# Patient Record
Sex: FEMALE | Race: WHITE
[De-identification: names, ages, dates, MRNs, and addresses within clinical notes are randomized per-mention and may not be internally consistent; named-entity substitution may affect disease eponyms.]

---

## 2017-02-24 ENCOUNTER — HOSPITAL ENCOUNTER (OUTPATIENT)
Dept: HOSPITAL 84 - D.RT | Age: 82
Discharge: HOME | End: 2017-02-24
Attending: INTERNAL MEDICINE
Payer: MEDICARE

## 2017-02-24 VITALS — BODY MASS INDEX: 28.2 KG/M2

## 2017-02-24 DIAGNOSIS — C34.90: Primary | ICD-10-CM

## 2017-05-09 ENCOUNTER — HOSPITAL ENCOUNTER (OUTPATIENT)
Dept: HOSPITAL 84 - D.OPS | Age: 82
Discharge: HOME | End: 2017-05-09
Attending: SURGERY
Payer: MEDICARE

## 2017-05-09 VITALS — HEIGHT: 60 IN | BODY MASS INDEX: 25.58 KG/M2 | WEIGHT: 130.27 LBS

## 2017-05-09 VITALS — DIASTOLIC BLOOD PRESSURE: 67 MMHG | SYSTOLIC BLOOD PRESSURE: 145 MMHG

## 2017-05-09 DIAGNOSIS — K44.9: Primary | ICD-10-CM

## 2017-05-09 DIAGNOSIS — J45.909: ICD-10-CM

## 2017-05-09 DIAGNOSIS — Z01.812: ICD-10-CM

## 2017-05-09 DIAGNOSIS — K21.9: ICD-10-CM

## 2017-05-09 DIAGNOSIS — Z95.1: ICD-10-CM

## 2017-05-09 DIAGNOSIS — I25.10: ICD-10-CM

## 2017-05-09 LAB
ANION GAP SERPL CALC-SCNC: 10.9 MMOL/L (ref 8–16)
BASOPHILS NFR BLD AUTO: 0.2 % (ref 0–2)
BUN SERPL-MCNC: 28 MG/DL (ref 7–18)
CALCIUM SERPL-MCNC: 9.1 MG/DL (ref 8.5–10.1)
CHLORIDE SERPL-SCNC: 106 MMOL/L (ref 98–107)
CO2 SERPL-SCNC: 27.7 MMOL/L (ref 21–32)
CREAT SERPL-MCNC: 1 MG/DL (ref 0.6–1.3)
EOSINOPHIL NFR BLD: 5.3 % (ref 0–7)
ERYTHROCYTE [DISTWIDTH] IN BLOOD BY AUTOMATED COUNT: 12.9 % (ref 11.5–14.5)
GLUCOSE SERPL-MCNC: 89 MG/DL (ref 74–106)
HCT VFR BLD CALC: 36 % (ref 36–48)
HGB BLD-MCNC: 11.8 G/DL (ref 12–16)
IMM GRANULOCYTES NFR BLD: 0.2 % (ref 0–5)
LYMPHOCYTES NFR BLD AUTO: 22.2 % (ref 15–50)
MCH RBC QN AUTO: 29.2 PG (ref 26–34)
MCHC RBC AUTO-ENTMCNC: 32.8 G/DL (ref 31–37)
MCV RBC: 89.1 FL (ref 80–100)
MONOCYTES NFR BLD: 7.3 % (ref 2–11)
NEUTROPHILS NFR BLD AUTO: 64.8 % (ref 40–80)
OSMOLALITY SERPL CALC.SUM OF ELEC: 285 MOSM/KG (ref 275–300)
PLATELET # BLD: 177 10X3/UL (ref 130–400)
PMV BLD AUTO: 10.5 FL (ref 7.4–10.4)
POTASSIUM SERPL-SCNC: 3.6 MMOL/L (ref 3.5–5.1)
RBC # BLD AUTO: 4.04 10X6/UL (ref 4–5.4)
SODIUM SERPL-SCNC: 141 MMOL/L (ref 136–145)
WBC # BLD AUTO: 5.9 10X3/UL (ref 4.8–10.8)

## 2017-05-09 NOTE — NUR
1010 SERVED FULL LIQUID DIET.  MARIZOL HUNTER R.N.
1135 DRESSED, AWAKE & ALERT.  GVIEN DISCHARGE INFORMATION INCLUDING:
MED REC, SHEET LISTING NSAIDS TO AVIOD, REFLUX ESOPHAGITIS
INFORMATION SHEETS, REFLUX ESOPHAGITIS/HIATEL HERNIA DIET SHEET &
DISCHARGE INSTRUCTIONS SHEET POST ENDOSCOPIC PROCEDURES, & RTC APPT.
PT VOICED UNDERSTANDING.  TO [place]IA VIA WHEELCHAIR ACCOMPANIED BY
.  MARIZOL HUNTER R.N.

## 2017-05-10 NOTE — OP
PATIENT NAME:  SRI MOELLER                            MEDICAL RECORD: [id number]
:37                                             LOCATION:D.OPS          
                                                         ADMISSION DATE:        
SURGEON:  CHAPIN WOLFE MD            
 
 
DATE OF OPERATION:  2017
 
PREOPERATIVE DIAGNOSES:
1.  Gastroesophageal reflux, intractable.
2.  Hiatal hernia.
 
POSTOPERATIVE DIAGNOSES:
1.  Gastroesophageal reflux, intractable.
2.  Hiatal hernia.
3.  Largely intrathoracic stomach (large hiatal hernia).
 
PROCEDURE:  Esophagogastroduodenoscopy with antral biopsies.
 
SURGEON:  Chapin Wolfe MD.
 
ASSISTANT:  None.
 
BLOOD LOSS:  Minimal.
 
ANESTHESIA:  IV sedation.
 
COMPLICATIONS:  None.
 
The reason for the anesthesia staff being present during the procedure includes
the possible need for airway manipulation, which was necessary during the
procedure.
 
ENDOSCOPIC COURSE:  The patient was conveyed to the endoscopy suite electively
on 2017.  IV sedation was induced by the anesthesia staff.  A bite block
was inserted.  A gastroscope was inserted into the mouth.  It was advanced
easily into the hypopharynx.  The esophagus was easily intubated as were the
stomach and duodenum.  Upon withdrawal, retroflexed and angulus views were
obtained.  Antral biopsies were obtained.  The endoscope was then withdrawn
under direct vision.
 
I will see the patient in my office in 2-3 weeks.  A hiatal hernia repair in her
case is going to be very difficult.  She will need to decide whether she wants
to undergo such a large hiatal hernia repair.
 
TRANSINT:[id number] Voice Confirmation ID: [id number] DOCUMENT ID: [id number]
                                           
                                           CHAPIN WOLFE MD            
 
 
 
Electronically Signed by CHAPIN WOLFE on 05/10/17 at 0943
CC: TAYLOR RICHTER MD                                             [phone]
DICTATION DATE: 17     :     17 1711      [facility] 
                                                                      17
[address] [place] [address]

## 2017-05-10 NOTE — HP
PATIENT: SRI MOELLER                                  MEDICAL RECORD: [id number]
ACCOUNT: [id number]                                    LOCATION:D.OPS         
: 37                                            ADMISSION DATE: 17
                                                         
 
                             HISTORY AND PHYSICAL EXAMINATION
 
 
CHIEF COMPLAINT:  Reflux.
 
HISTORY OF PRESENT ILLNESS:  The patient has gastroesophageal reflux.  She has a
large hiatal hernia.  The gastroesophageal reflux is intractable.  The patient
is to undergo an EGD in order to determine whether she can safely undergo a
hiatal hernia repair.
 
ALLERGIES:  ATORVASTATIN.
 
HOME MEDICATIONS:  Xarelto, fish oil, Cozaar, aspirin, Colace, Senokot as well
as flaxseed.
 
PAST MEDICAL AND SURGICAL HISTORY:  Possible asthma, coronary artery disease,
history of CABG, history of aortic valve replacement, history of CVA, history of
weakness from CVA, hiatal hernia, arthritis, , tonsillectomy and
adenoidectomy, also removal of a lung nodule.
 
PHYSICAL EXAMINATION:
GENERAL:  The patient does not appear acutely ill.  She does not appear
chronically ill.
VITAL SIGNS:  Reviewed.
HEAD:  External ears appear normal.
EYES:  Extraocular movements are intact.
NECK:  Trachea is midline.
CHEST:  No intercostal retractions.
PULMONARY:  Nonlabored.
 
IMPRESSION:
1.  Gastroesophageal reflux.
2.  Hiatal hernia.
 
PLAN:  Will be EGD.
 
TRANSINT:[id number] Voice Confirmation ID: [id number] DOCUMENT ID: [id number]
 
 
                                           
                                           RUBI WOLFE MD            
 
 
 
Electronically Signed by RUBI WOLFE on 05/10/17 at 0943
CC: TAYLOR RICHTER MD, DARRELL BURNHAM MD and VENU SANCHEZ MD    [phone]
DICTATION DATE: 17 09     :     17 1358      [facility] 
                                                                      17
[address] [place] [address]

## 2017-08-09 ENCOUNTER — HOSPITAL ENCOUNTER (OUTPATIENT)
Dept: HOSPITAL 84 - D.RAD | Age: 82
Discharge: HOME | End: 2017-08-09
Attending: INTERNAL MEDICINE
Payer: MEDICARE

## 2017-08-09 VITALS — BODY MASS INDEX: 25.4 KG/M2

## 2017-08-09 DIAGNOSIS — C34.32: Primary | ICD-10-CM

## 2019-06-15 ENCOUNTER — HOSPITAL ENCOUNTER (EMERGENCY)
Dept: HOSPITAL 84 - D.ER | Age: 84
Discharge: HOME | End: 2019-06-15
Payer: MEDICARE

## 2019-06-15 VITALS — BODY MASS INDEX: 25.58 KG/M2 | HEIGHT: 60 IN | WEIGHT: 130.27 LBS

## 2019-06-15 VITALS — SYSTOLIC BLOOD PRESSURE: 176 MMHG | DIASTOLIC BLOOD PRESSURE: 93 MMHG
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
## 2019-06-15 DIAGNOSIS — S62.112A: Primary | ICD-10-CM
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
## 2019-07-25 ENCOUNTER — HOSPITAL ENCOUNTER (INPATIENT)
Dept: HOSPITAL 84 - D.ER | Age: 84
LOS: 4 days | Discharge: HOME | DRG: 190 | End: 2019-07-29
Attending: FAMILY MEDICINE | Admitting: FAMILY MEDICINE
Payer: MEDICARE

## 2019-07-25 VITALS — SYSTOLIC BLOOD PRESSURE: 132 MMHG | DIASTOLIC BLOOD PRESSURE: 48 MMHG

## 2019-07-25 VITALS
BODY MASS INDEX: 41.72 KG/M2 | HEIGHT: 60 IN | WEIGHT: 212.5 LBS | HEIGHT: 60 IN | WEIGHT: 212.5 LBS | BODY MASS INDEX: 41.72 KG/M2

## 2019-07-25 VITALS — DIASTOLIC BLOOD PRESSURE: 55 MMHG | SYSTOLIC BLOOD PRESSURE: 122 MMHG

## 2019-07-25 VITALS — SYSTOLIC BLOOD PRESSURE: 133 MMHG | DIASTOLIC BLOOD PRESSURE: 67 MMHG

## 2019-07-25 VITALS — SYSTOLIC BLOOD PRESSURE: 105 MMHG | DIASTOLIC BLOOD PRESSURE: 54 MMHG

## 2019-07-25 DIAGNOSIS — C34.90: ICD-10-CM

## 2019-07-25 DIAGNOSIS — J44.0: ICD-10-CM

## 2019-07-25 DIAGNOSIS — J44.1: Primary | ICD-10-CM

## 2019-07-25 DIAGNOSIS — N17.9: ICD-10-CM

## 2019-07-25 DIAGNOSIS — I11.0: ICD-10-CM

## 2019-07-25 DIAGNOSIS — K44.9: ICD-10-CM

## 2019-07-25 DIAGNOSIS — Z86.73: ICD-10-CM

## 2019-07-25 DIAGNOSIS — I50.30: ICD-10-CM

## 2019-07-25 DIAGNOSIS — K21.9: ICD-10-CM

## 2019-07-25 DIAGNOSIS — J98.11: ICD-10-CM

## 2019-07-25 DIAGNOSIS — R91.8: ICD-10-CM

## 2019-07-25 DIAGNOSIS — J18.1: ICD-10-CM

## 2019-07-25 LAB
ALBUMIN SERPL-MCNC: 2.7 G/DL (ref 3.4–5)
ALP SERPL-CCNC: 88 U/L (ref 46–116)
ALT SERPL-CCNC: 18 U/L (ref 10–68)
ANION GAP SERPL CALC-SCNC: 9.8 MMOL/L (ref 8–16)
APPEARANCE UR: CLEAR
BASOPHILS NFR BLD AUTO: 0.2 % (ref 0–2)
BILIRUB SERPL-MCNC: 0.3 MG/DL (ref 0.2–1.3)
BILIRUB SERPL-MCNC: NEGATIVE MG/DL
BUN SERPL-MCNC: 35 MG/DL (ref 7–18)
CALCIUM SERPL-MCNC: 9 MG/DL (ref 8.5–10.1)
CHLORIDE SERPL-SCNC: 104 MMOL/L (ref 98–107)
CK MB SERPL-MCNC: 1 U/L (ref 0–3.6)
CK SERPL-CCNC: 32 UL (ref 21–215)
CO2 SERPL-SCNC: 28.5 MMOL/L (ref 21–32)
COLOR UR: (no result)
CREAT SERPL-MCNC: 0.9 MG/DL (ref 0.6–1.3)
EOSINOPHIL NFR BLD: 0.9 % (ref 0–7)
ERYTHROCYTE [DISTWIDTH] IN BLOOD BY AUTOMATED COUNT: 13.2 % (ref 11.5–14.5)
GLOBULIN SER-MCNC: 4.4 G/L
GLUCOSE SERPL-MCNC: 148 MG/DL (ref 74–106)
GLUCOSE SERPL-MCNC: NEGATIVE MG/DL
HCT VFR BLD CALC: 43 % (ref 36–48)
HGB BLD-MCNC: 14.4 G/DL (ref 12–16)
IMM GRANULOCYTES NFR BLD: 0.2 % (ref 0–5)
KETONES UR STRIP-MCNC: NEGATIVE MG/DL
LYMPHOCYTES NFR BLD AUTO: 15.1 % (ref 15–50)
MCH RBC QN AUTO: 29 PG (ref 26–34)
MCHC RBC AUTO-ENTMCNC: 33.5 G/DL (ref 31–37)
MCV RBC: 86.7 FL (ref 80–100)
MONOCYTES NFR BLD: 5.7 % (ref 2–11)
NEUTROPHILS NFR BLD AUTO: 77.9 % (ref 40–80)
NITRITE UR-MCNC: NEGATIVE MG/ML
NT-PROBNP SERPL-MCNC: 185 PG/ML (ref 0–450)
OSMOLALITY SERPL CALC.SUM OF ELEC: 286 MOSM/KG (ref 275–300)
PH UR STRIP: 5 [PH] (ref 5–6)
PLATELET # BLD: 269 10X3/UL (ref 130–400)
PMV BLD AUTO: 10.2 FL (ref 7.4–10.4)
POTASSIUM SERPL-SCNC: 4.3 MMOL/L (ref 3.5–5.1)
PROT SERPL-MCNC: 7.1 G/DL (ref 6.4–8.2)
PROT UR-MCNC: NEGATIVE MG/DL
RBC # BLD AUTO: 4.96 10X6/UL (ref 4–5.4)
SODIUM SERPL-SCNC: 138 MMOL/L (ref 136–145)
SP GR UR STRIP: 1 (ref 1–1.02)
TROPONIN I SERPL-MCNC: < 0.017 NG/ML (ref 0–0.06)
UROBILINOGEN UR-MCNC: NORMAL MG/DL
WBC # BLD AUTO: 9.6 10X3/UL (ref 4.8–10.8)

## 2019-07-25 NOTE — NUR
RECEIVED PT VIA STRETCHER. A&O X3.  PRESENT AT BEDSIDE. RR EVEN AND
UNLABORED AT THIS TIME. NO S/S OF DISTRESS NOTED. NO C/O PAIN OR DISCOMFORT.
UP TO RR X1 ASSIST. GAIT SLOW AND STEADY. WILL CPOC.

## 2019-07-26 VITALS — DIASTOLIC BLOOD PRESSURE: 59 MMHG | SYSTOLIC BLOOD PRESSURE: 122 MMHG

## 2019-07-26 VITALS — SYSTOLIC BLOOD PRESSURE: 127 MMHG | DIASTOLIC BLOOD PRESSURE: 74 MMHG

## 2019-07-26 VITALS — SYSTOLIC BLOOD PRESSURE: 128 MMHG | DIASTOLIC BLOOD PRESSURE: 60 MMHG

## 2019-07-26 VITALS — SYSTOLIC BLOOD PRESSURE: 101 MMHG | DIASTOLIC BLOOD PRESSURE: 53 MMHG

## 2019-07-26 LAB
ALBUMIN SERPL-MCNC: 2.4 G/DL (ref 3.4–5)
ALP SERPL-CCNC: 79 U/L (ref 46–116)
ALT SERPL-CCNC: 16 U/L (ref 10–68)
ANION GAP SERPL CALC-SCNC: 12.3 MMOL/L (ref 8–16)
BASOPHILS NFR BLD AUTO: 0 % (ref 0–2)
BILIRUB SERPL-MCNC: 0.19 MG/DL (ref 0.2–1.3)
BUN SERPL-MCNC: 37 MG/DL (ref 7–18)
CALCIUM SERPL-MCNC: 8.6 MG/DL (ref 8.5–10.1)
CHLORIDE SERPL-SCNC: 103 MMOL/L (ref 98–107)
CO2 SERPL-SCNC: 26.9 MMOL/L (ref 21–32)
CREAT SERPL-MCNC: 0.9 MG/DL (ref 0.6–1.3)
EOSINOPHIL NFR BLD: 0 % (ref 0–7)
ERYTHROCYTE [DISTWIDTH] IN BLOOD BY AUTOMATED COUNT: 13.2 % (ref 11.5–14.5)
GLOBULIN SER-MCNC: 4.2 G/L
GLUCOSE SERPL-MCNC: 156 MG/DL (ref 74–106)
HCT VFR BLD CALC: 39 % (ref 36–48)
HGB BLD-MCNC: 13.2 G/DL (ref 12–16)
IMM GRANULOCYTES NFR BLD: 0.4 % (ref 0–5)
LYMPHOCYTES NFR BLD AUTO: 14.3 % (ref 15–50)
MAGNESIUM SERPL-MCNC: 2.1 MG/DL (ref 1.8–2.4)
MCH RBC QN AUTO: 28.7 PG (ref 26–34)
MCHC RBC AUTO-ENTMCNC: 33.8 G/DL (ref 31–37)
MCV RBC: 84.8 FL (ref 80–100)
MONOCYTES NFR BLD: 0.9 % (ref 2–11)
NEUTROPHILS NFR BLD AUTO: 84.4 % (ref 40–80)
NT-PROBNP SERPL-MCNC: 170 PG/ML (ref 0–450)
OSMOLALITY SERPL CALC.SUM OF ELEC: 287 MOSM/KG (ref 275–300)
PHOSPHATE SERPL-MCNC: 4.9 MG/DL (ref 2.5–4.9)
PLATELET # BLD: 234 10X3/UL (ref 130–400)
PMV BLD AUTO: 10.1 FL (ref 7.4–10.4)
POTASSIUM SERPL-SCNC: 4.2 MMOL/L (ref 3.5–5.1)
PROT SERPL-MCNC: 6.6 G/DL (ref 6.4–8.2)
RBC # BLD AUTO: 4.6 10X6/UL (ref 4–5.4)
SODIUM SERPL-SCNC: 138 MMOL/L (ref 136–145)
WBC # BLD AUTO: 4.5 10X3/UL (ref 4.8–10.8)

## 2019-07-26 NOTE — NUR
PT ASKING FOR SOMETHING TO HELP HER SLEEP, CALL DR. MACDONALD, SAID SHE WOULD
RATHER NOT AT THIS TIME,

## 2019-07-26 NOTE — NUR
PATIENT IS STABLE AND VSS.  AT BS. PATIENT DENIES ANY NEEDS OR PAIN.
OFFERED BATH BUT PATIENT DECLINED. DID GIVE PATIENT ALL NEEDED HYGIENE ITEMS.
 TELEMETRY APPLIED. SR HR 80. WILL CONTINUE WITH PLAN OF CARE. SR UP X 2 BED
IN LOW POSITION AND CALL LIGHT IN REACH.

## 2019-07-26 NOTE — NUR
PATIENT RESTING COMFORTABLY IN BED WITH EYES CLOSED AND RESPIRATIONS NON
LABORED AND EVEN.  AT BS. WILL CONTINUE TO MONITOR. SR UP X 2 BED IN
LOW POSITION AND CALL LIGHT IN REACH.

## 2019-07-26 NOTE — NUR
REPORT RECEIVED FROM NIGHT SHIFT AND PATIENT CARE ASSUMED. PATIENT LAYING IN
BED ON BACK WITH EYES CLOSED AND BREATHING EVENLY. WILL CONTINUE WITH PLAN OF
CARE. SR UP X 2 BED IN LOW POSITIOJN AND CALL LIGHT IN REACH.

## 2019-07-26 NOTE — NUR
RECEIVED REPORT, WILL ASSUME CARE OF PT, DENIES ANY NEEDS AT THIS TIME, BED IS
LOW, SRX2, CALL LIGHT IN REACH, WILL CONTINUE PLAN OF CARE

## 2019-07-26 NOTE — MORECARE
CASE MANAGEMENT DISCHARGE SUMMARY
 
 
PATIENT: SRI MEADE                      UNIT: [id number]
ACCOUNT#: [id number]                       ADM DATE: 19
AGE: 83     : 37  SEX: F            ROOM/BED: D.1209    
AUTHOR: ILYA,DOC                             PHYSICIAN:                               
 
REFERRING PHYSICIAN: DELORES MACDONALD MD           
DATE OF SERVICE: 19
Discharge Plan
 
 
Patient Name: SRI MEADE
Facility: [facility]:[place]
Encounter #: [id number]
Medical Record #: [id number]
: 1935
Planned Disposition: Home
Anticipated Discharge Date: 
 
Discharge Date: 
Expected LOS: 
Initial Reviewer: [id number]
Initial Review Date: 2019
Generated: 19   9:08 pm 
Comments
 
DCP- Discharge Planning
 
Updated by [email or web address]: Anita Schwarz on 19   7:06 pm CT
Patient Name: SRI MEADE                                     
Admission Status: ER   
Accout number: [id number]                              
Admission Date: 2019   
: 1935                                                        
Admission Diagnosis:DYSPNEA, UNSPECIFIED   
Attending: DELORES PATEL                                                
Current LOS:  1   
  
Anticipated DC Date:    
Planned Disposition: Home   
Primary Insurance: MEDICARE A & B   
  
  
Discharge Planning Comments: CM met with patient  at bedside after explaining 
CM role and obtaining verbal consent. Patient lives at home with her  
and plans to return there upon discharge.  Patient feels this would be a safe 
discharge.  CM discussed availability / needs of home health and medical 
equipment.  Patient denies any discharge needs at this time. Patient states 
 
she will have her   drive her home upon discharge.  CM will continue 
to follow and assist as needed with discharge planning / needs.  
  
  
  
  
  
  
: Anita Schwarz
 DCPIA - Discharge Planning Initial Assessment
 
Updated by [id number]: Anita Schwarz on 19   8:03 pm
*  Is the patient Alert and Oriented?
Yes
*  How many steps to enter\exit or inside your home?
 
*  PCP
[facility]
*  Pharmacy
[facility]
*  Preadmission Environment
Home with Family
*  ADLs
Independent
*  Equipment
None
*  List name and contact numbers for known caregivers / representatives who 
currently or will assist patient after discharge:
Nicolas Meade - [facility] - [phone]
*  Verbal permission to speak to the caregivers and representatives has been 
obtained from the patient.
N/A
*  Community resources currently utilized
None
*  Additional services required to return to the preadmission environment?
No
*  Can the patient safely return to the preadmission environment?
Yes
*  Has this patient been hospitalized within the prior 30 days at any 
hospital?
No
 
 
 
 
 
 
 
Last DP export: 19   7:02 p
Patient Name: SRI MEADE
 
Encounter #: [id number]
Page [id number]
 
 
 
 
 
Electronically Signed by NATA CARABALLO on 19 at 2009
 
 
 
 
 
 
**All edits/amendments must be made on the electronic document**
 
DICTATION DATE: 19     : JAIMEE  19     
RPT#: [id number]                                DC DATE:        
                                               STATUS: ADM IN  
[facility]
[address] [place] [address]
***END OF REPORT***

## 2019-07-26 NOTE — MORECARE
CASE MANAGEMENT DISCHARGE SUMMARY
 
 
PATIENT: SRI MOELLER                      UNIT: [id number]
ACCOUNT#: [id number]                       ADM DATE: 19
AGE: 83     : 37  SEX: F            ROOM/BED: D.1209    
AUTHOR: NATA CARABALLO                             PHYSICIAN:                               
 
REFERRING PHYSICIAN: DELORES MACDONALD MD           
DATE OF SERVICE: 19
Discharge Plan
 
 
Patient Name: SRI MOELLER
[facility]
Encounter #: [id number]
Medical Record #: [id number]
: 1935
Planned Disposition: Home
Anticipated Discharge Date: 
 
Discharge Date: 
Expected LOS: 
Initial Reviewer: [id number]
Initial Review Date: 2019
Generated: 19   9:02 pm 
  
 
 
 
 
 
 
 
Patient Name: SRI MOELLER
 
Encounter #: [id number]
Page [id number]
 
 
 
 
 
Electronically Signed by NATA CARABALLO on 19 at 
 
 
 
 
 
 
**All edits/amendments must be made on the electronic document**
 
DICTATION DATE: 19     : JAIMEE  19     
RPT#: [id number]                                DC DATE:        
                                               STATUS: ADM IN  
[facility]
[address] [place] [address]
***END OF REPORT***

## 2019-07-26 NOTE — NUR
PT WENT TO CT PER ORDER AND HAS RETURNED TO ROOM. SOB ON EXERTION. NO FURTHER
NEEDS EXPRESSED AT THIS TIME.

## 2019-07-27 VITALS — SYSTOLIC BLOOD PRESSURE: 109 MMHG | DIASTOLIC BLOOD PRESSURE: 52 MMHG

## 2019-07-27 VITALS — SYSTOLIC BLOOD PRESSURE: 108 MMHG | DIASTOLIC BLOOD PRESSURE: 61 MMHG

## 2019-07-27 VITALS — SYSTOLIC BLOOD PRESSURE: 106 MMHG | DIASTOLIC BLOOD PRESSURE: 54 MMHG

## 2019-07-27 VITALS — DIASTOLIC BLOOD PRESSURE: 78 MMHG | SYSTOLIC BLOOD PRESSURE: 112 MMHG

## 2019-07-27 VITALS — SYSTOLIC BLOOD PRESSURE: 114 MMHG | DIASTOLIC BLOOD PRESSURE: 80 MMHG

## 2019-07-27 LAB
ALBUMIN SERPL-MCNC: 2.3 G/DL (ref 3.4–5)
ALP SERPL-CCNC: 68 U/L (ref 46–116)
ALT SERPL-CCNC: 16 U/L (ref 10–68)
ANION GAP SERPL CALC-SCNC: 8.7 MMOL/L (ref 8–16)
BILIRUB SERPL-MCNC: 0.25 MG/DL (ref 0.2–1.3)
BUN SERPL-MCNC: 41 MG/DL (ref 7–18)
CALCIUM SERPL-MCNC: 8.7 MG/DL (ref 8.5–10.1)
CHLORIDE SERPL-SCNC: 106 MMOL/L (ref 98–107)
CO2 SERPL-SCNC: 29 MMOL/L (ref 21–32)
CREAT SERPL-MCNC: 1 MG/DL (ref 0.6–1.3)
GLOBULIN SER-MCNC: 3.9 G/L
GLUCOSE SERPL-MCNC: 100 MG/DL (ref 74–106)
MAGNESIUM SERPL-MCNC: 2 MG/DL (ref 1.8–2.4)
OSMOLALITY SERPL CALC.SUM OF ELEC: 288 MOSM/KG (ref 275–300)
PHOSPHATE SERPL-MCNC: 3.2 MG/DL (ref 2.5–4.9)
POTASSIUM SERPL-SCNC: 3.7 MMOL/L (ref 3.5–5.1)
PROT SERPL-MCNC: 6.2 G/DL (ref 6.4–8.2)
SODIUM SERPL-SCNC: 140 MMOL/L (ref 136–145)

## 2019-07-27 NOTE — NUR
PATIENT SITTING UP IN BED LOOKING AT CELL PHONE. PATIENT HAVING INCREASED
WHEEZING. CALLED RESPIRATORY FOR NEBULIZER TREATMENT. PATIENT DENIES ANY
OTHER NEEDS OR PAIN. SR UP X 2 BED IN LOW POSTION AND CALL LIGHT IN REACH.

## 2019-07-27 NOTE — NUR
REPORT RECEIVED FROM NIGHT SHIFT AND PATIENT CARE ASSUMED. PATIENT IS LAYING
IN BED ON BACK. PATIENT IS PLEASANT, ALERT AND COME CONFUSION. PATIENT DENIES
ANY NEEDS OR PAIN. BROUGHT PATIENT CUP OF COFFEE. VSS AND PATIENT IS STABLE.
WILL CONTINUE WITH PLAN OF CARE. SR UP NX 2 BED IN LOW POSITION AND CALL LIGHT
IN REACH.

## 2019-07-27 NOTE — NUR
DR GONZALES IN ROOM. NEW ORDERS RECEIVED. PATIENT IS STABLE AND VSS. WILL
CONTINUE TO MONITOR. SR UP X 2 BED IN LOW POSITION AND CALL LIGHT IN REACH.

## 2019-07-27 NOTE — NUR
PT IS ALERT AND ORIENTED X 3 WITH DISORIENTATION TO TIME. PT RESPONDS
APPROPRIATELY TO MOST QUESTIONS BUT IS SLOW TO RESPOND AT TIMES.  AT
BEDSIDE. PT PRESENTS WITH EXPIRATORY WHEEZES BILATERALLY. HAS LEFT AC THAT IS
PATENT AND INFUSING ABX AT THIS TIME. WEARING TELE MONITOR. SEE CHART. SCD'S
ON. FALL PRECAUTIONS IN PLACE. CALL LIGHT IN REACH. PT PROVIDED RETURN
DEMONSTRATION ON HOW TO USE THE CALL LIGHT EFFECTIVELY. DENIES FURTHER NEEDS
AT THIS TIME. BED LOCKED AND LOWERED WITH TWO SIDE RAILS UP. CPOC.

## 2019-07-27 NOTE — NUR
ASSISTED PT TO RESTROOM AND BACK TO BED, SCD ARE ON, POSEY ALARM IS ON, CALL
LIGHT IN REACH, WILL CONTINUE PLAN OF CARE

## 2019-07-28 VITALS — SYSTOLIC BLOOD PRESSURE: 104 MMHG | DIASTOLIC BLOOD PRESSURE: 50 MMHG

## 2019-07-28 VITALS — SYSTOLIC BLOOD PRESSURE: 111 MMHG | DIASTOLIC BLOOD PRESSURE: 76 MMHG

## 2019-07-28 VITALS — DIASTOLIC BLOOD PRESSURE: 54 MMHG | SYSTOLIC BLOOD PRESSURE: 118 MMHG

## 2019-07-28 VITALS — DIASTOLIC BLOOD PRESSURE: 78 MMHG | SYSTOLIC BLOOD PRESSURE: 102 MMHG

## 2019-07-28 LAB
ALBUMIN SERPL-MCNC: 2.5 G/DL (ref 3.4–5)
ALP SERPL-CCNC: 70 U/L (ref 46–116)
ALT SERPL-CCNC: 16 U/L (ref 10–68)
ANION GAP SERPL CALC-SCNC: 13.2 MMOL/L (ref 8–16)
BILIRUB SERPL-MCNC: 0.19 MG/DL (ref 0.2–1.3)
BUN SERPL-MCNC: 39 MG/DL (ref 7–18)
CALCIUM SERPL-MCNC: 8.6 MG/DL (ref 8.5–10.1)
CHLORIDE SERPL-SCNC: 102 MMOL/L (ref 98–107)
CO2 SERPL-SCNC: 24.8 MMOL/L (ref 21–32)
CREAT SERPL-MCNC: 1.1 MG/DL (ref 0.6–1.3)
GLOBULIN SER-MCNC: 3.9 G/L
GLUCOSE SERPL-MCNC: 146 MG/DL (ref 74–106)
MAGNESIUM SERPL-MCNC: 1.9 MG/DL (ref 1.8–2.4)
OSMOLALITY SERPL CALC.SUM OF ELEC: 283 MOSM/KG (ref 275–300)
PHOSPHATE SERPL-MCNC: 3.4 MG/DL (ref 2.5–4.9)
POTASSIUM SERPL-SCNC: 4 MMOL/L (ref 3.5–5.1)
PROT SERPL-MCNC: 6.4 G/DL (ref 6.4–8.2)
SODIUM SERPL-SCNC: 136 MMOL/L (ref 136–145)

## 2019-07-28 NOTE — NUR
ENTERED PATIENT ROOM. PATIENT STATES THAT SHE FEELS MUCH BETTER AND
APOLOGIZED FOR HER EARLIER BEHAVIOR. I EXPLAINED THAT I CERTAINLY UNDERSTAND
THAT SHE WAS IN PAIN AND THAT HER  HAD TO LEAVE FOR WORK. I EMPATHIZED
AND WE VISITED FOR MORE THAN 20 MINUTES LOOKING AT HER CHILDREN PICTURES ON
HER PHONE, ETC. SHE THANKED
THIS NURSE FOR HER CARE AND UNDERSTANDING. PATIENT WISHES TO REMAIN IN BS
CHAIR. CALL LIGHT IN PATIENT LAP. WILL CONTINUE TO MONITOR.

## 2019-07-28 NOTE — NUR
REPORT RECEIVED FROM NIGHT SHIFT AND PATIENT CARE ASSUMED. PATIENT AWAKE,
ALERT AND DISORIENTED TO TIME. PATIENT APPEARS IN NO ACUTE DISTRESS. VSS.
PATIENT DENIES ANY NEEDS OR PAIN. WILL CONTINUE WITH PLAN OF CARE. SR UP X 2
BED IN LOW POSITION AND CALL LIGHT IN REACH.

## 2019-07-28 NOTE — NUR
PATIENT RESTING COMFORTABLY WITH  AT BS. VSS. WILL CONTINUE TO MONITOR.
SR UP X 2 BED IN LOW POSITION AND CALL LIGHT IN REACH.

## 2019-07-28 NOTE — NUR
PATIENT RECEIVED SITTING UP IN BED. ASSESSMENT & VITAL SIGNS DONE. NO C/O PAIN
OR DISTRESS. BED LOW. CALL LIGHT WITHIN REACH. WILL CONTINUE TO MONITOR.

## 2019-07-28 NOTE — NUR
CALLED TO PATIENT ROOM. PATIENT SITTING UP IN BED ROCKING BACK AND FORTH AND
RUBBING HER LEGS. PATIENT IS TEARFUL. PATIENT STATES THAT DR RAHMAN TOLD HER
SHE COULD GO HOME TONIGHT AND THAT SINCE SHE IS NOT GETTING ANY PAIN MED OR NO
ONE IS CHECKING ON HER THAT SHE NIGHT AS WELL GO HOME. I INFORMED PATIENT THAT
I HAD NOT SEEN AN ORDER FROM DR RAHMAN FOR DC BUT I WOULD CHECK HIS NOT. I
ALSO INFORMED THAT I WAS SORRY THAT SHE FELT THAT WAY AND THAT WHEN I CHECKED
ON HER 2 HOURS EARLIER , AFTER SHE HAD AMBULATED IN THE HALLWAY, THAT SHE WAS
LAYING IN BED AND TALKING ON THE PHONE. HER  WAS AT  AND I DID NOT
KNOW SHE WAS IN PAIN BECAUSE SHE HAD NOT TOLD ME. IN OR PAULA FOR ME TO KNOW SHE
IS IN PAIN, SHE HAS TO INFORM ME. I PROMPTLY MEDICATED PER MAR WITH
HYDROCODONE 10/325 MG PO. I HAD HER SIT UP IN BS CHAIR AND CHANGED IN [place].
 I CHECKED DR RAHMAN NOTES AND INFORMED HER THAT HE DID NOT WROTE ANYTHING
ABOUT DC TONIGHT. I ASKED IF THERE WAS ANYTHING MORE THAT I COULD DO FOR HER
AT THIS TIME AND SHE STATED NO. CALL LIGHT IN PATIENTS LAP .

## 2019-07-29 VITALS — SYSTOLIC BLOOD PRESSURE: 120 MMHG | DIASTOLIC BLOOD PRESSURE: 62 MMHG

## 2019-07-29 VITALS — DIASTOLIC BLOOD PRESSURE: 55 MMHG | SYSTOLIC BLOOD PRESSURE: 109 MMHG

## 2019-07-29 VITALS — SYSTOLIC BLOOD PRESSURE: 110 MMHG | DIASTOLIC BLOOD PRESSURE: 60 MMHG

## 2019-07-29 LAB
ALBUMIN SERPL-MCNC: 2.6 G/DL (ref 3.4–5)
ALP SERPL-CCNC: 67 U/L (ref 46–116)
ALT SERPL-CCNC: 16 U/L (ref 10–68)
ANION GAP SERPL CALC-SCNC: 12.7 MMOL/L (ref 8–16)
BILIRUB SERPL-MCNC: 0.2 MG/DL (ref 0.2–1.3)
BUN SERPL-MCNC: 36 MG/DL (ref 7–18)
CALCIUM SERPL-MCNC: 9 MG/DL (ref 8.5–10.1)
CHLORIDE SERPL-SCNC: 103 MMOL/L (ref 98–107)
CO2 SERPL-SCNC: 26.3 MMOL/L (ref 21–32)
CREAT SERPL-MCNC: 0.9 MG/DL (ref 0.6–1.3)
GLOBULIN SER-MCNC: 3.7 G/L
GLUCOSE SERPL-MCNC: 162 MG/DL (ref 74–106)
MAGNESIUM SERPL-MCNC: 2.2 MG/DL (ref 1.8–2.4)
OSMOLALITY SERPL CALC.SUM OF ELEC: 287 MOSM/KG (ref 275–300)
PHOSPHATE SERPL-MCNC: 3.4 MG/DL (ref 2.5–4.9)
POTASSIUM SERPL-SCNC: 4 MMOL/L (ref 3.5–5.1)
PROT SERPL-MCNC: 6.3 G/DL (ref 6.4–8.2)
SODIUM SERPL-SCNC: 138 MMOL/L (ref 136–145)

## 2019-07-29 NOTE — NUR
GAVE VERBAL APPROVAL TO D/C PT. PIV REMOVED WITH CATHETER TIP
INTACT. TELEMETRY REMOVED AND GIVEN TO MONITOR TECH,

## 2019-07-29 NOTE — MORECARE
CASE MANAGEMENT DISCHARGE SUMMARY
 
 
PATIENT: SRI MEADE                      UNIT: [id number]
ACCOUNT#: [id number]                       ADM DATE: 19
AGE: 83     : 37  SEX: F            ROOM/BED: D.1209    
AUTHOR: ILYA,DOC                             PHYSICIAN:                               
 
REFERRING PHYSICIAN: DELORES MACDONALD MD           
DATE OF SERVICE: 19
Discharge Plan
 
 
Patient Name: SRI MEADE
Facility: [facility]:[place]
Encounter #: [id number]
Medical Record #: [id number]
: 1935
Planned Disposition: Home
Anticipated Discharge Date: 
 
Discharge Date: 
Expected LOS: 
Initial Reviewer: [id number]
Initial Review Date: 2019
Generated: 19   4:18 pm 
Comments
 
DCP- Discharge Planning
 
Updated by [id number]: Anita Schwarz on 19   7:06 pm CT
Patient Name: SRI MEADE                                     
Admission Status: ER   
Accout number: [id number]                              
Admission Date: 2019   
: 1935                                                        
Admission Diagnosis:DYSPNEA, UNSPECIFIED   
Attending: DELORES PATEL                                                
Current LOS:  1   
  
Anticipated DC Date:    
Planned Disposition: Home   
Primary Insurance: MEDICARE A & B   
  
  
Discharge Planning Comments: CM met with patient  at bedside after explaining 
CM role and obtaining verbal consent. Patient lives at home with her  
and plans to return there upon discharge.  Patient feels this would be a safe 
discharge.  CM discussed availability / needs of home health and medical 
equipment.  Patient denies any discharge needs at this time. Patient states 
 
she will have her   drive her home upon discharge.  CM will continue 
to follow and assist as needed with discharge planning / needs.  
  
  
  
  
  
  
: Anita Schwarz
 DCPIA - Discharge Planning Initial Assessment
 
Updated by [id number]: Anita Schwarz on 19   8:03 pm
*  Is the patient Alert and Oriented?
Yes
*  How many steps to enter\exit or inside your home?
 
*  PCP
[facility]
*  Pharmacy
[facility]
*  Preadmission Environment
Home with Family
*  ADLs
Independent
*  Equipment
None
*  List name and contact numbers for known caregivers / representatives who 
currently or will assist patient after discharge:
Nicolas Meade - [facility] - [phone]
*  Verbal permission to speak to the caregivers and representatives has been 
obtained from the patient.
N/A
*  Community resources currently utilized
None
*  Additional services required to return to the preadmission environment?
No
*  Can the patient safely return to the preadmission environment?
Yes
*  Has this patient been hospitalized within the prior 30 days at any 
hospital?
No
 
External Providers
[facility]
 
Next Contact Date: 
Service Request Date: 
Service Type: 
Resolution: 
 
Reviewer: 
Comments: 
 
 
 
 
 
 
 
Last DP export: 19   7:09 p
Patient Name: SRI MEADE
Encounter #: [id number]
Page [id number]
 
 
 
 
 
Electronically Signed by NATA CARABALLO on 19 at 1518
 
 
 
 
 
 
**All edits/amendments must be made on the electronic document**
 
DICTATION DATE: 19     : JAIMEE  19     
RPT#: [id number]                                DC DATE:        
                                               STATUS: ADM IN  
[facility]
 [place] [address]
***END OF REPORT***

## 2019-07-29 NOTE — NUR
DISCHARGE INSTRUCTIONS REVIEWED WITH PT AND ALL QUESTIONS ANSWERED. ASSISTED
PT TO FRONT OF HOSPITAL VIA WHEELCHAIR WHERE SHE LEFT WITH SPOUSE

## 2019-07-29 NOTE — NUR
PATIENT EYES CLOSED. RESPIRATIONS 18 & EVEN.  AT BEDSIDE. SCDS ON. CALL
LIGHT WITHIN REACH. WILL CONTINUE TO MONITOR.

## 2019-07-29 NOTE — NUR
PT RESTING IN BED, SPOUSE AT BEDSIDE. SHIFT ASSESSMENT PERFORMED. DENIES
ANY NEEDS AT THIS TIME. WILL CONT
TO FOLLOW POC

## 2019-07-29 NOTE — NUR
PT STATES SHE DOES NOT WEAR O2 AT HOME. PT O2 SAT ON RA IS 95% NURSE PREFORMED
WALK TEST WITH PT ON RA AND HER O2 SAT DROPPED TO 84% UPON EXERTION. NURSE
APPLIED 2L NC AND WALKED WITH PT. PT KEPT O2 AT 94% UPON EXERTION WITH THE 2L
NC.

## 2019-07-29 NOTE — NUR
Nutrition Follow-up:
Pt reports no change in appetite and states she ate ~50% this AM. Refuses oral
supplements.
Diet: Low Sodium/No Added Salt
PO intake: 46% over last 10 meals
BM: 7/28
Labs noted: Alb 2.6, Glu 162
Meds noted: Solumedrol, KDur, Lasix
Rec continue current diet as tolerated. Honor food preferences within diet
order. RD following.

## 2019-07-30 NOTE — MORECARE
CASE MANAGEMENT DISCHARGE SUMMARY
 
 
PATIENT: SRI MEADE                      UNIT: [id number]
ACCOUNT#: [id number]                       ADM DATE: 19
AGE: 83     : 37  SEX: F            ROOM/BED: D.1209    
AUTHOR: ILYA,DOC                             PHYSICIAN:                               
 
REFERRING PHYSICIAN: DELORES MACDONALD MD           
DATE OF SERVICE: 19
Discharge Plan
 
 
Patient Name: SRI MEADE
Facility: [facility]:[place]
Encounter #: [id number]
Medical Record #: [id number]
: 1935
Planned Disposition: Home
Anticipated Discharge Date: 
 
Discharge Date: 2019
Expected LOS: 
Initial Reviewer: [id number]
Initial Review Date: 2019
Generated: 19   2:10 pm 
Comments
 
DCP- Discharge Planning
 
Updated by [id number]: Anita Schwarz on 19  12:06 pm CT
Late Entry  19   
  
Patient Name:  SRI MEADE   
Encounter No:  [id number]   
:  1935   
Primary Insurance:  MEDICARE A & B  
Anticipated DC Date:    
Planned Disposition:  Home  
External Planned Provider: : D/C IMM signed @[facility]   ELIJAH signed for DME 
-[employer] for home 02 / portable 02 and nebulizer with duo-neb updrafts  
  
  
DCP follow-up note: Patient and family in agreement with discharge plan. No 
changes to plan. Case management will follow and assist as needed.  
Anita Schwarz
DCP- Discharge Planning
 
Updated by [id number]: Anita Chong on 19   7:06 pm CT
Patient Name: SRI MEADE                                     
 
Admission Status: ER   
Accout number: [id number]                              
Admission Date: 2019   
: 1935                                                        
Admission Diagnosis:DYSPNEA, UNSPECIFIED   
Attending: DELORES PATEL                                                
Current LOS:  1   
  
Anticipated DC Date:    
Planned Disposition: Home   
Primary Insurance: MEDICARE A & B   
  
  
Discharge Planning Comments: CM met with patient  at bedside after explaining 
CM role and obtaining verbal consent. Patient lives at home with her  
and plans to return there upon discharge.  Patient feels this would be a safe 
discharge.  CM discussed availability / needs of home health and medical 
equipment.  Patient denies any discharge needs at this time. Patient states 
she will have her   drive her home upon discharge.  CM will continue 
to follow and assist as needed with discharge planning / needs.  
  
  
  
  
  
  
: Anita Schwarz
 DCPIA - Discharge Planning Initial Assessment
 
Updated by [id number]: Anita Schwarz on 19   8:03 pm
*  Is the patient Alert and Oriented?
Yes
*  How many steps to enter\exit or inside your home?
 
*  PCP
[facility]
*  Pharmacy
[facility]
*  Preadmission Environment
Home with Family
*  ADLs
Independent
*  Equipment
None
*  List name and contact numbers for known caregivers / representatives who 
currently or will assist patient after discharge:
Nicolas Meade - [facility] - [phone]
*  Verbal permission to speak to the caregivers and representatives has been 
obtained from the patient.
N/A
*  Community resources currently utilized
None
 
*  Additional services required to return to the preadmission environment?
No
*  Can the patient safely return to the preadmission environment?
Yes
*  Has this patient been hospitalized within the prior 30 days at any 
hospital?
No
 
 
 
 
 
Coverage Notice
 
Reviewer: [email or web address] Dee Dee Schwarz
 
Notice Issued Date-Time: 2019  14:15
Notice Type: IM Discharge Notice
 
Notice Delivered To: Patient
Relationship to Patient: Self
Representative Name: 
 
Delivery Method: HAND - Hand Delivered
Edad Days:
Prior Verbal Notification: 
 
Recipient Understood Notice: Yes
Recipient Signature: Yes
Med Rec Note Co-signed by Attending:
 
Coverage Notice Comment:  
Reviewer: [email or web address] Dee Dee Schwarz
 
Notice Issued Date-Time: 2019  14:15
Notice Type: Patient Choice Letter
 
Notice Delivered To: Patient
Relationship to Patient: Self
Representative Name: 
 
Delivery Method: HAND - Hand Delivered
Edda Days:
Prior Verbal Notification: 
 
Recipient Understood Notice: Yes
Recipient Signature: Yes
Med Rec Note Co-signed by Attending:
 
Coverage Notice Comment:  ELIJAH FOR DME
 
Last DP export: 19   2:18 p
 
Patient Name: SRI MEADE
Encounter #: [id number]
Page [phone]
 
 
 
 
 
Electronically Signed by NATA CARABALLO on 19 at 1310
 
 
 
 
 
 
**All edits/amendments must be made on the electronic document**
 
DICTATION DATE: 19 1310     : JAIMEE  19 1310     
RPT#: [id number]                                DC DATE:19
                                               STATUS: DIS IN  
[facility]
[address] [place] [address]
***END OF REPORT***

## 2019-08-01 NOTE — MORECARE
CASE MANAGEMENT DISCHARGE SUMMARY
 
 
PATIENT: SRI MEADE                      UNIT: [id number]
ACCOUNT#: [id number]                       ADM DATE: 19
AGE: 83     : [birth year]  SEX: F            ROOM/BED: D.1209    
AUTHOR: ILYA,DOC                             PHYSICIAN:                               
 
REFERRING PHYSICIAN: DELORES MACDONALD MD           
DATE OF SERVICE: 19
Discharge Plan
 
 
Patient Name: SRI MEADE
Facility: [facility]:[place]
Encounter #: [id number]
Medical Record #: [id number]
: 1935
Planned Disposition: Home
Anticipated Discharge Date: 
 
Discharge Date: 2019
Expected LOS: 
Initial Reviewer: [id number]
Initial Review Date: 2019
Generated: 19   8:32 am 
Comments
 
DCP- Discharge Planning
 
Updated by [email or web address]: Anita Schwarz on 19  12:06 pm CT
Late Entry  19   
  
Patient Name:  SRI MEADE   
Encounter No:  [id number]   
:  1935   
Primary Insurance:  MEDICARE A & B  
Anticipated DC Date:    
Planned Disposition:  Home  
External Planned Provider: : D/C IMM signed @[facility]   ELIJAH signed for DME 
-[employer] for home 02 / portable 02 and nebulizer with duo-neb updrafts  
  
  
DCP follow-up note: Patient and family in agreement with discharge plan. No 
changes to plan. Case management will follow and assist as needed.  
Anita Schwarz
DCP- Discharge Planning
 
Updated by [email or web address]: Anita Schwarz on 19   7:06 pm CT
Patient Name: SRI MEADE                                     
 
Admission Status: ER   
Accout number: [id number]                              
Admission Date: 2019   
: 1935                                                        
Admission Diagnosis:DYSPNEA, UNSPECIFIED   
Attending: DELORES PATEL                                                
Current LOS:  1   
  
Anticipated DC Date:    
Planned Disposition: Home   
Primary Insurance: MEDICARE A & B   
  
  
Discharge Planning Comments: CM met with patient  at bedside after explaining 
CM role and obtaining verbal consent. Patient lives at home with her  
and plans to return there upon discharge.  Patient feels this would be a safe 
discharge.  CM discussed availability / needs of home health and medical 
equipment.  Patient denies any discharge needs at this time. Patient states 
she will have her   drive her home upon discharge.  CM will continue 
to follow and assist as needed with discharge planning / needs.  
  
  
  
  
  
  
: Anita Schwarz
 DCPIA - Discharge Planning Initial Assessment
 
Updated by [email or web address]: Anita Schwarz on 19   8:03 pm
*  Is the patient Alert and Oriented?
Yes
*  How many steps to enter\exit or inside your home?
 
*  PCP
[facility]
*  Pharmacy
[facility]
*  Preadmission Environment
Home with Family
*  ADLs
Independent
*  Equipment
None
*  List name and contact numbers for known caregivers / representatives who 
currently or will assist patient after discharge:
Nicolas Meade - [facility] [place] - [phone]
*  Verbal permission to speak to the caregivers and representatives has been 
obtained from the patient.
N/A
*  Community resources currently utilized
None
 
*  Additional services required to return to the preadmission environment?
No
*  Can the patient safely return to the preadmission environment?
Yes
*  Has this patient been hospitalized within the prior 30 days at any 
hospital?
No
 
 
 
 
 
Coverage Notice
 
Reviewer: [id number] Dee Dee Schwarz
 
Notice Issued Date-Time: 2019  14:15
Notice Type: IM Discharge Notice
 
Notice Delivered To: Patient
Relationship to Patient: Self
Representative Name: 
 
Delivery Method: HAND - Hand Delivered
Edda Days:
Prior Verbal Notification: 
 
Recipient Understood Notice: Yes
Recipient Signature: Yes
Med Rec Note Co-signed by Attending:
 
Coverage Notice Comment:  
Reviewer: [email or web address] Dee Dee Schwarz
 
Notice Issued Date-Time: 2019  14:15
Notice Type: Patient Choice Letter
 
Notice Delivered To: Patient
Relationship to Patient: Self
Representative Name: 
 
Delivery Method: HAND - Hand Delivered
Edda Days:
Prior Verbal Notification: 
 
Recipient Understood Notice: Yes
Recipient Signature: Yes
Med Rec Note Co-signed by Attending:
 
Coverage Notice Comment:  ELIJAH FOR DME
 
Last DP export: 19  12:10 p
 
Patient Name: SRI MEADE
Encounter #: [id number]
Page [id number]
 
 
 
 
 
Electronically Signed by NATA CARABALLO on 19 at 0733
 
 
 
 
 
 
**All edits/amendments must be made on the electronic document**
 
DICTATION DATE: 19     : JAIMEE  19     
RPT#: [id number]                                DC DATE:19
                                               STATUS: DIS IN  
[id number] [place] [address]
***END OF REPORT***

## 2020-09-29 ENCOUNTER — HOSPITAL ENCOUNTER (EMERGENCY)
Dept: HOSPITAL 84 - D.ER | Age: 85
Discharge: HOME | End: 2020-09-29
Payer: MEDICARE

## 2020-09-29 VITALS
WEIGHT: 120.25 LBS | HEIGHT: 60 IN | BODY MASS INDEX: 23.61 KG/M2 | HEIGHT: 60 IN | WEIGHT: 120.25 LBS | BODY MASS INDEX: 23.61 KG/M2

## 2020-09-29 VITALS — DIASTOLIC BLOOD PRESSURE: 76 MMHG | SYSTOLIC BLOOD PRESSURE: 179 MMHG

## 2020-09-29 DIAGNOSIS — Z86.73: ICD-10-CM

## 2020-09-29 DIAGNOSIS — Z95.1: ICD-10-CM

## 2020-09-29 DIAGNOSIS — I25.10: ICD-10-CM

## 2020-09-29 DIAGNOSIS — R07.9: Primary | ICD-10-CM

## 2020-09-29 DIAGNOSIS — K44.9: ICD-10-CM

## 2020-09-29 DIAGNOSIS — I50.9: ICD-10-CM

## 2020-09-29 LAB
ALBUMIN SERPL-MCNC: 3.9 G/DL (ref 3.4–5)
ALP SERPL-CCNC: 89 U/L (ref 30–120)
ALT SERPL-CCNC: 15 U/L (ref 10–68)
ANION GAP SERPL CALC-SCNC: 13.7 MMOL/L (ref 8–16)
APTT BLD: 24.9 SECONDS (ref 22.8–39.4)
BASOPHILS NFR BLD AUTO: 0.1 % (ref 0–2)
BILIRUB SERPL-MCNC: 0.38 MG/DL (ref 0.2–1.3)
BUN SERPL-MCNC: 44 MG/DL (ref 7–18)
CALCIUM SERPL-MCNC: 9.4 MG/DL (ref 8.5–10.1)
CHLORIDE SERPL-SCNC: 105 MMOL/L (ref 98–107)
CK MB SERPL-MCNC: 2.2 U/L (ref 0–3.6)
CK SERPL-CCNC: 79 UL (ref 21–215)
CO2 SERPL-SCNC: 23.3 MMOL/L (ref 21–32)
CREAT SERPL-MCNC: 1 MG/DL (ref 0.6–1.3)
EOSINOPHIL NFR BLD: 4.3 % (ref 0–7)
ERYTHROCYTE [DISTWIDTH] IN BLOOD BY AUTOMATED COUNT: 13.4 % (ref 11.5–14.5)
GLOBULIN SER-MCNC: 3.5 G/L
GLUCOSE SERPL-MCNC: 105 MG/DL (ref 74–106)
HCT VFR BLD CALC: 42 % (ref 36–48)
HGB BLD-MCNC: 13.8 G/DL (ref 12–16)
IMM GRANULOCYTES NFR BLD: 0.3 % (ref 0–5)
INR PPP: 0.94 (ref 0.85–1.17)
LYMPHOCYTES NFR BLD AUTO: 29.9 % (ref 15–50)
MAGNESIUM SERPL-MCNC: 1.8 MG/DL (ref 1.8–2.4)
MCH RBC QN AUTO: 29.2 PG (ref 26–34)
MCHC RBC AUTO-ENTMCNC: 32.9 G/DL (ref 31–37)
MCV RBC: 88.8 FL (ref 80–100)
MONOCYTES NFR BLD: 7.2 % (ref 2–11)
NEUTROPHILS NFR BLD AUTO: 58.2 % (ref 40–80)
OSMOLALITY SERPL CALC.SUM OF ELEC: 286 MOSM/KG (ref 275–300)
PLATELET # BLD: 215 10X3/UL (ref 130–400)
PMV BLD AUTO: 10.6 FL (ref 7.4–10.4)
POTASSIUM SERPL-SCNC: 4 MMOL/L (ref 3.5–5.1)
PROT SERPL-MCNC: 7.4 G/DL (ref 6.4–8.2)
PROTHROMBIN TIME: 12.6 SECONDS (ref 11.6–15)
RBC # BLD AUTO: 4.73 10X6/UL (ref 4–5.4)
SODIUM SERPL-SCNC: 138 MMOL/L (ref 136–145)
TROPONIN I SERPL-MCNC: < 0.017 NG/ML (ref 0–0.06)
WBC # BLD AUTO: 7.8 10X3/UL (ref 4.8–10.8)

## 2021-03-09 ENCOUNTER — HOSPITAL ENCOUNTER (OUTPATIENT)
Dept: HOSPITAL 84 - D.OPS | Age: 86
Discharge: HOME | End: 2021-03-09
Attending: ORTHOPAEDIC SURGERY
Payer: MEDICARE

## 2021-03-09 VITALS
HEIGHT: 60 IN | HEIGHT: 60 IN | BODY MASS INDEX: 24.54 KG/M2 | WEIGHT: 125 LBS | WEIGHT: 125 LBS | BODY MASS INDEX: 24.54 KG/M2

## 2021-03-09 DIAGNOSIS — I48.91: ICD-10-CM

## 2021-03-09 DIAGNOSIS — E78.5: ICD-10-CM

## 2021-03-09 DIAGNOSIS — S52.502A: Primary | ICD-10-CM

## 2021-03-09 DIAGNOSIS — I10: ICD-10-CM

## 2021-03-09 DIAGNOSIS — I25.119: ICD-10-CM
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
## 2021-03-09 DIAGNOSIS — J44.9: ICD-10-CM

## 2021-03-09 LAB
ANION GAP SERPL CALC-SCNC: 11.2 MMOL/L (ref 8–16)
APTT BLD: 27.3 SECONDS (ref 22.8–39.4)
BASOPHILS NFR BLD AUTO: 0.2 % (ref 0–2)
BUN SERPL-MCNC: 32 MG/DL (ref 7–18)
CALCIUM SERPL-MCNC: 9.4 MG/DL (ref 8.5–10.1)
CHLORIDE SERPL-SCNC: 107 MMOL/L (ref 98–107)
CO2 SERPL-SCNC: 27 MMOL/L (ref 21–32)
CREAT SERPL-MCNC: 0.8 MG/DL (ref 0.6–1.3)
EOSINOPHIL NFR BLD: 3.4 % (ref 0–7)
ERYTHROCYTE [DISTWIDTH] IN BLOOD BY AUTOMATED COUNT: 13.3 % (ref 11.5–14.5)
GLUCOSE SERPL-MCNC: 102 MG/DL (ref 74–106)
HCT VFR BLD CALC: 38.6 % (ref 36–48)
HGB BLD-MCNC: 12.6 G/DL (ref 12–16)
IMM GRANULOCYTES NFR BLD: 0.6 % (ref 0–5)
INR PPP: 1.09 (ref 0.85–1.17)
LYMPHOCYTES # BLD: 0.89 10X3/UL (ref 1.18–3.74)
LYMPHOCYTES NFR BLD AUTO: 17.6 % (ref 15–50)
MCH RBC QN AUTO: 28.8 PG (ref 26–34)
MCHC RBC AUTO-ENTMCNC: 32.6 G/DL (ref 31–37)
MCV RBC: 88.3 FL (ref 80–100)
MONOCYTES NFR BLD: 6.7 % (ref 2–11)
NEUTROPHILS # BLD: 3.62 10X3/UL (ref 1.56–6.13)
NEUTROPHILS NFR BLD AUTO: 71.5 % (ref 40–80)
OSMOLALITY SERPL CALC.SUM OF ELEC: 287 MOSM/KG (ref 275–300)
PLATELET # BLD: 169 10X3/UL (ref 130–400)
PMV BLD AUTO: 11 FL (ref 7.4–10.4)
POTASSIUM SERPL-SCNC: 4.2 MMOL/L (ref 3.5–5.1)
PROTHROMBIN TIME: 13.1 SECONDS (ref 11.6–15)
RBC # BLD AUTO: 4.37 10X6/UL (ref 4–5.4)
SODIUM SERPL-SCNC: 141 MMOL/L (ref 136–145)
WBC # BLD AUTO: 5.1 10X3/UL (ref 4.8–10.8)

## 2021-03-10 NOTE — OP
PATIENT NAME:  SRI MEADE                            MEDICAL RECORD: [id number]
:[birth year]                                             LOCATION:D.OPS          
                                                         ADMISSION DATE:        
SURGEON:  LANRE GALLEGOS DO         
 
 
DATE OF OPERATION:  2021
 
PROCEDURE PERFORMED:  Left distal radius open reduction internal fixation.
 
PREOPERATIVE DIAGNOSIS:  Left distal radius fracture, extraarticular.
 
POSTOPERATIVE DIAGNOSIS:  Left distal radius fracture, extraarticular.
 
INDICATIONS:  Mr. Meade is an 85-year-old female, who fell and fractured her
left distal radius.  She was seen in urgent care and sent to my office
yesterday.  I saw her and said it was severely displaced and had dorsal
comminution.  I told her we probably should fixate it or would continue to fall
dorsally.  She is okay with that.  She is aware of the risks including
infection, bleeding, malunion, nonunion, continued pain, damage to nerves or
vessels in the area, need for further surgery, failure of hardware and cutout
and she signed the consent.
 
DESCRIPTION OF PROCEDURE:  Lanre Gallegos DO 
 
The patient given block by anesthesia in the preoperative area and taken to the
operative suite, laid in supine position.  Given general anesthetic, was given a
gram of Ancef and sedated and LMA was placed.  The left upper extremity was then
prepped and draped in sterile fashion.  A timeout was performed.  Everyone was
in agreement with correct side, site, patient, and procedure.  I then
exsanguinated the left upper extremity with Esmarch and tourniquet was inflated
to 250 mmHg, was up for 4 minutes.  I then made an incision along the flexor
carpi radialis tendon.  I made careful dissection down to the distal radius and
saw had a venous tourniquet, let the tourniquet down at that time.  I then
peeled the pronator quadratus off the fracture and reduced the fracture, put a
K-wire in.  Once in adequate position, I put a plate on and pinned it in a good
position.  We then put 4 distal locking screws in through the plate and then 2
locking screws and a shaft screw in the shaft.  We got x-rays and saw that the
screws were a little bit long on the distal portion and the lateral and removed
them and shifted the radius slightly ulnar the distal part and then put 18 mm
screws and took out the 20 and out the 18 in the distal portion and put in 18 in
the styloid.  I then put the locking screws back in the shaft and the sliding
hole screw had to be removed as it did not grab anything.  She was then
irrigated and then I closed the skin with 3-0 Vicryl in an inverted interrupted
fashion and placed Prineo glue on the skin.  She had a skin tear on the dorsal
surface of the skin of the wrist and the forearm, placed Adaptic on that as well
covered with a 4 x 4 and cast padding and put a volar splint on and secured with
an Ace wrap.  She was then awakened and taken to recovery in stable condition.
 
BLOOD LOSS:  Minimal.
 
COMPLICATIONS:  None.
 
TRANSINT:[id number] Voice Confirmation ID: [id number] DOCUMENT ID: [id number]
 
 
 
OPERATIVE REPORT                               [id number]    SRI MEADE,LANRE OLMSTEAD DO         
 
 
 
Electronically Signed by LANRE ELLIOTT on 03/10/21 at 0819
 
 
 
 
 
 
 
 
 
 
 
 
 
 
 
 
 
 
 
 
 
 
 
 
 
 
 
 
 
 
 
 
 
 
 
 
 
 
 
 
 
CC:                                                             [id number]
DICTATION DATE: 21 1433     :     21 2215      [facility] 
                                                                      [id number] [place] [address]

## 2021-03-19 ENCOUNTER — HOSPITAL ENCOUNTER (OUTPATIENT)
Dept: HOSPITAL 84 - D.RT | Age: 86
Discharge: HOME | End: 2021-03-19
Attending: INTERNAL MEDICINE
Payer: MEDICARE

## 2021-03-19 VITALS — BODY MASS INDEX: 2.5 KG/M2

## 2021-03-19 DIAGNOSIS — Z11.52: ICD-10-CM

## 2021-03-19 DIAGNOSIS — J44.9: Primary | ICD-10-CM
